# Patient Record
Sex: FEMALE | Race: WHITE | NOT HISPANIC OR LATINO | Employment: FULL TIME | URBAN - METROPOLITAN AREA
[De-identification: names, ages, dates, MRNs, and addresses within clinical notes are randomized per-mention and may not be internally consistent; named-entity substitution may affect disease eponyms.]

---

## 2023-02-13 ENCOUNTER — OFFICE VISIT (OUTPATIENT)
Dept: FAMILY MEDICINE | Facility: CLINIC | Age: 48
End: 2023-02-13
Payer: COMMERCIAL

## 2023-02-13 VITALS
WEIGHT: 263.9 LBS | BODY MASS INDEX: 41.42 KG/M2 | TEMPERATURE: 98.1 F | OXYGEN SATURATION: 100 % | HEIGHT: 67 IN | DIASTOLIC BLOOD PRESSURE: 90 MMHG | SYSTOLIC BLOOD PRESSURE: 140 MMHG | HEART RATE: 80 BPM

## 2023-02-13 DIAGNOSIS — E66.01 MORBID OBESITY (H): ICD-10-CM

## 2023-02-13 DIAGNOSIS — R03.0 ELEVATED BLOOD PRESSURE READING WITHOUT DIAGNOSIS OF HYPERTENSION: ICD-10-CM

## 2023-02-13 DIAGNOSIS — Z71.89 SEEN BY MARRIAGE GUIDANCE COUNSELOR: Primary | ICD-10-CM

## 2023-02-13 PROCEDURE — 99203 OFFICE O/P NEW LOW 30 MIN: CPT | Performed by: FAMILY MEDICINE

## 2023-02-13 RX ORDER — DESVENLAFAXINE 100 MG/1
100 TABLET, EXTENDED RELEASE ORAL
COMMUNITY
Start: 2023-01-25

## 2023-02-13 RX ORDER — LAMOTRIGINE 100 MG/1
TABLET ORAL
COMMUNITY
Start: 2023-01-25

## 2023-02-13 RX ORDER — DEXTROAMPHETAMINE SACCHARATE, AMPHETAMINE ASPARTATE, DEXTROAMPHETAMINE SULFATE AND AMPHETAMINE SULFATE 5; 5; 5; 5 MG/1; MG/1; MG/1; MG/1
20 TABLET ORAL
COMMUNITY
Start: 2022-08-29

## 2023-02-13 NOTE — PROGRESS NOTES
"  Assessment & Plan     (Z71.89) Seen by marriage guidance counselor  (primary encounter diagnosis)  Comment: pt has some difficulty in her marriage and getting worse. She likes to have marriage consult.   Plan: Adult Mental Health  Referral            (E66.01) Morbid obesity (H)  Comment: bmi 41 and failed to improve  Plan: advise regular exercise and health diet to loss weight.     (R03.0) Elevated blood pressure reading without diagnosis of hypertension  Comment: bp is elevated at office. Pt state she is little nervious due to see the new provider. She does not check bp at home. Denies cp, sob, palpitation, headache and blurry vision.   Plan: advise to monitor bp at home regularly and follow-up in 2 weeks.    }     BMI:   Estimated body mass index is 41.33 kg/m  as calculated from the following:    Height as of this encounter: 1.702 m (5' 7\").    Weight as of this encounter: 119.7 kg (263 lb 14.4 oz).   Weight management plan: Discussed healthy diet and exercise guidelines        Return in about 4 weeks (around 3/13/2023) for Routine preventive.    Roxy Felton MD  [facility] NORMA Guerra is a 47 year old, presenting for the following health issues:  [facility] and Referral (Mental health )      History of Present Illness       Reason for visit:  Establish being a patient and marriage therapy referral    She eats 2-3 servings of fruits and vegetables daily.She consumes 0 sweetened beverage(s) daily.She exercises with enough effort to increase her heart rate 20 to 29 minutes per day.    She is taking medications regularly.      Review of Systems   Constitutional, HEENT, cardiovascular, pulmonary, gi and gu systems are negative, except as otherwise noted.      Objective    BP (!) 140/88 (BP Location: Right arm, Patient Position: Sitting, Cuff Size: Adult Large)   Pulse 80   Temp 98.1  F (36.7  C) (Oral)   Ht 1.702 m (5' 7\")   Wt " 119.7 kg (263 lb 14.4 oz)   SpO2 100%   BMI 41.33 kg/m    Body mass index is 41.33 kg/m .  Physical Exam   GENERAL: healthy, alert and no distress  NECK: no adenopathy, no asymmetry, masses, or scars and thyroid normal to palpation  RESP: lungs clear to auscultation - no rales, rhonchi or wheezes  CV: regular rate and rhythm, normal S1 S2, no S3 or S4, no murmur, click or rub, no peripheral edema and peripheral pulses strong

## 2023-03-13 ENCOUNTER — TELEPHONE (OUTPATIENT)
Dept: PSYCHOLOGY | Facility: CLINIC | Age: 48
End: 2023-03-13
Payer: COMMERCIAL

## 2023-03-13 DIAGNOSIS — Z53.9 ERRONEOUS ENCOUNTER--DISREGARD: Primary | ICD-10-CM

## 2023-03-13 NOTE — TELEPHONE ENCOUNTER
Voice message was left regarding new option for appointment time. 3/30/2023 at 3:30 PM in person. She may reschedule if needed.

## 2023-05-05 ENCOUNTER — OFFICE VISIT (OUTPATIENT)
Dept: PSYCHOLOGY | Facility: CLINIC | Age: 48
End: 2023-05-05
Payer: COMMERCIAL

## 2023-05-05 DIAGNOSIS — F41.1 GENERALIZED ANXIETY DISORDER: Primary | ICD-10-CM

## 2023-05-05 PROCEDURE — 90837 PSYTX W PT 60 MINUTES: CPT | Performed by: MARRIAGE & FAMILY THERAPIST

## 2023-05-08 NOTE — PROGRESS NOTES
[facility]   Mental Health & Addiction Services     Progress Note - Initial Visit    Patient  Name:  Mari Chew Date: 5/05/23           Service Type: Individual     Visit Start Time: 3:30 PM    Visit End Time: 4:40 PM    Visit #:   1    Attendees: Client and Spouse / Significant Other    Service Modality:  In-person       DATA:  Extended Session (53+ minutes): PROLONGED SERVICE IN THE OUTPATIENT SETTING REQUIRING DIRECT (FACE-TO-FACE) PATIENT CONTACT BEYOND THE USUAL SERVICE:    - Longer session due to limited access to mental health appointments and necessity to address patient's distress / complexity  Interactive Complexity: No  Crisis: No     Presenting Concerns/  Current Stressors:   Client presented to couples counseling as a 47-year-old   female residing in [facility] with her  [place] attendance.  She states that she is the oldest of 6 children to a intact family system, and Pat is the oldest of 2 children to an intact family system.  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  She and Yue have 3 children together ages 19, 18 and 17.  She reports a long history of anxiety and becomes irritated within conflicts.  She states that she is frustrated easily and frequently within interactions with her  and states that she has felt minimized at times and has difficulties communicating turning the partner's.  Yue confirmed her report, and stated that Mari has heightened emotions, and often will yell when she feels minimized and Yue confirmed that he shuts down during conflicts and often will avoid or disregard conflict.  Yue currently is a zaldivar to self [place] area for a major world  of bread.      ASSESSMENT:  Mental Status Assessment:  Appearance:   Appropriate   Eye Contact:   Good   Psychomotor Behavior: Normal  Hyperactive   Attitude:   Cooperative  Friendly  Orientation:   Person  Place Time Situation  Speech   Rate / Production: Normal/ Responsive Emotional Talkative   Volume:  Normal   Mood:    Anxious  Depressed  Sad   Affect:    Worrisome   Thought Content:  Clear   Thought Form:  Obsessive   Insight:    Good       Safety Issues and Plan for Safety and Risk Management:     [place] Suicide Severity Rating Scale (Lifetime/Recent)       View : No data to display.            To be completed upon next session  Patient denies current fears or concerns for personal safety.  Patient denies current or recent suicidal ideation or behaviors.  Patient denies current or recent homicidal ideation or behaviors.  Patient denies current or recent self injurious behavior or ideation.  Patient denies other safety concerns.  Recommended that patient call 911 or go to the local ED should there be a change in any of these risk factors.  Patient reports there are no firearms in the house.     Diagnostic Criteria:  Generalized Anxiety Disorder  A. Excessive anxiety and worry about a number of events or activities (such as work or school performance).   B. The person finds it difficult to control the worry.  C. Select 3 or more symptoms (required for diagnosis). Only one item is required in children.   - Restlessness or feeling keyed up or on edge.    - Being easily fatigued.    - Difficulty concentrating or mind going blank.    - Irritability.    - Muscle tension.   D. The focus of the anxiety and worry is not confined to features of an Axis I disorder.  E. The anxiety, worry, or physical symptoms cause clinically significant distress or impairment in social, occupational, or other important areas of functioning.   F. The disturbance is not due to the direct physiological effects of a substance (e.g., a drug of abuse, a medication) or a general medical condition (e.g., hyperthyroidism) and does not occur exclusively during a Mood Disorder, a Psychotic Disorder, or a Pervasive Developmental Disorder.    - The  "aformentioned symptoms began several  year(s) ago and occurs 7 days per week and is experienced as moderate.      DSM5 Diagnoses: (Sustained by DSM5 Criteria Listed Above)  Diagnoses: 300.02 (F41.1) Generalized Anxiety Disorder  Psychosocial & Contextual Factors: Marital conflict, 3 children deal with anxiety depression and ADHD, currently completing school  WHODAS 2.0 (12 item):        View : No data to display.              Intervention:   Educated regarding emotionally focused couples therapy, including Morenita James's book \"hold me tight\"  Collateral Reports Completed:  Not Applicable      PLAN: (Homework, other):  1. Provider will continue Diagnostic Assessment.  Patient was given the following to do until next session: Complete acquisition of \"Hold me tight\" material and utilization of [employer] \"Card Decks\" noreen.    2. Provider recommended the following referrals: Outpatient behavioral health counseling/mental health counselin-2 sessions per month addressing conflict reduction/resolution as well as communication strategies.      3.  Suicide Risk and Safety Concerns were assessed for Mari Chew.    Patient meets the following risk assessment and triage: Patient denied any current/recent/lifetime history of suicidal ideation and/or behaviors.  No safety plan indicated at this time.       Jeff Escalona, CLARA  May 8, 2023        "